# Patient Record
Sex: MALE | Race: BLACK OR AFRICAN AMERICAN | NOT HISPANIC OR LATINO | Employment: UNEMPLOYED | URBAN - METROPOLITAN AREA
[De-identification: names, ages, dates, MRNs, and addresses within clinical notes are randomized per-mention and may not be internally consistent; named-entity substitution may affect disease eponyms.]

---

## 2024-08-15 ENCOUNTER — HOSPITAL ENCOUNTER (EMERGENCY)
Facility: HOSPITAL | Age: 34
Discharge: HOME | End: 2024-08-15
Attending: FAMILY MEDICINE
Payer: COMMERCIAL

## 2024-08-15 VITALS
BODY MASS INDEX: 24.44 KG/M2 | SYSTOLIC BLOOD PRESSURE: 162 MMHG | OXYGEN SATURATION: 98 % | RESPIRATION RATE: 16 BRPM | WEIGHT: 165 LBS | DIASTOLIC BLOOD PRESSURE: 102 MMHG | HEART RATE: 98 BPM | HEIGHT: 69 IN | TEMPERATURE: 99 F

## 2024-08-15 DIAGNOSIS — R00.0 TACHYCARDIA: ICD-10-CM

## 2024-08-15 DIAGNOSIS — F14.90 COCAINE USE: Primary | ICD-10-CM

## 2024-08-15 DIAGNOSIS — Z00.8 MEDICAL CLEARANCE FOR INCARCERATION: ICD-10-CM

## 2024-08-15 DIAGNOSIS — R07.89 ATYPICAL CHEST PAIN: ICD-10-CM

## 2024-08-15 LAB
ABS NEUT CALC (OHS): 9.1 X10(3)/MCL (ref 2.1–9.2)
ALBUMIN SERPL-MCNC: 4.6 G/DL (ref 3.5–5)
ALBUMIN/GLOB SERPL: 1.2 RATIO (ref 1.1–2)
ALP SERPL-CCNC: 64 UNIT/L (ref 40–150)
ALT SERPL-CCNC: 17 UNIT/L (ref 0–55)
AMPHET UR QL SCN: POSITIVE
ANION GAP SERPL CALC-SCNC: 9 MEQ/L
ANISOCYTOSIS BLD QL SMEAR: ABNORMAL
APAP SERPL-MCNC: <3 UG/ML (ref 10–30)
AST SERPL-CCNC: 23 UNIT/L (ref 5–34)
BACTERIA #/AREA URNS AUTO: ABNORMAL /HPF
BARBITURATE SCN PRESENT UR: NEGATIVE
BENZODIAZ UR QL SCN: NEGATIVE
BILIRUB SERPL-MCNC: 0.6 MG/DL
BILIRUB UR QL STRIP.AUTO: NEGATIVE
BUN SERPL-MCNC: 13.5 MG/DL (ref 8.9–20.6)
CALCIUM SERPL-MCNC: 9.9 MG/DL (ref 8.4–10.2)
CANNABINOIDS UR QL SCN: POSITIVE
CHLORIDE SERPL-SCNC: 103 MMOL/L (ref 98–107)
CLARITY UR: CLEAR
CO2 SERPL-SCNC: 25 MMOL/L (ref 22–29)
COCAINE UR QL SCN: POSITIVE
COLOR UR AUTO: YELLOW
CREAT SERPL-MCNC: 1.02 MG/DL (ref 0.73–1.18)
CREAT/UREA NIT SERPL: 13
ERYTHROCYTE [DISTWIDTH] IN BLOOD BY AUTOMATED COUNT: 13.5 % (ref 11.5–17)
FENTANYL UR QL SCN: NEGATIVE
GFR SERPLBLD CREATININE-BSD FMLA CKD-EPI: >60 ML/MIN/1.73/M2
GLOBULIN SER-MCNC: 3.8 GM/DL (ref 2.4–3.5)
GLUCOSE SERPL-MCNC: 121 MG/DL (ref 74–100)
GLUCOSE UR QL STRIP: NORMAL
HCT VFR BLD AUTO: 44.2 % (ref 42–52)
HGB BLD-MCNC: 14.8 G/DL (ref 14–18)
HGB UR QL STRIP: ABNORMAL
HYALINE CASTS #/AREA URNS LPF: ABNORMAL /LPF
KETONES UR QL STRIP: ABNORMAL
LEUKOCYTE ESTERASE UR QL STRIP: NEGATIVE
LYMPHOCYTES NFR BLD MANUAL: 0.94 X10(3)/MCL
LYMPHOCYTES NFR BLD MANUAL: 9 % (ref 13–40)
MCH RBC QN AUTO: 31.1 PG (ref 27–31)
MCHC RBC AUTO-ENTMCNC: 33.5 G/DL (ref 33–36)
MCV RBC AUTO: 92.9 FL (ref 80–94)
MDMA UR QL SCN: NEGATIVE
MONOCYTES NFR BLD MANUAL: 0.42 X10(3)/MCL (ref 0.1–1.3)
MONOCYTES NFR BLD MANUAL: 4 % (ref 2–11)
MUCOUS THREADS URNS QL MICRO: ABNORMAL /LPF
NEUTROPHILS NFR BLD MANUAL: 87 % (ref 47–80)
NITRITE UR QL STRIP: NEGATIVE
NRBC BLD AUTO-RTO: 0 %
OHS QRS DURATION: 98 MS
OHS QTC CALCULATION: 467 MS
OPIATES UR QL SCN: NEGATIVE
PCP UR QL: NEGATIVE
PH UR STRIP: 5.5 [PH]
PH UR: 5.5 [PH] (ref 3–11)
PLATELET # BLD AUTO: 259 X10(3)/MCL (ref 130–400)
PLATELET # BLD EST: NORMAL 10*3/UL
PMV BLD AUTO: 10.6 FL (ref 7.4–10.4)
POTASSIUM SERPL-SCNC: 4.1 MMOL/L (ref 3.5–5.1)
PROT SERPL-MCNC: 8.4 GM/DL (ref 6.4–8.3)
PROT UR QL STRIP: ABNORMAL
RBC # BLD AUTO: 4.76 X10(6)/MCL (ref 4.7–6.1)
RBC #/AREA URNS AUTO: ABNORMAL /HPF
RBC MORPH BLD: ABNORMAL
SALICYLATES SERPL-MCNC: <5 MG/DL (ref 15–30)
SODIUM SERPL-SCNC: 137 MMOL/L (ref 136–145)
SP GR UR STRIP.AUTO: 1.03 (ref 1–1.03)
SPECIFIC GRAVITY, URINE AUTO (.000) (OHS): 1.03 (ref 1–1.03)
SQUAMOUS #/AREA URNS LPF: ABNORMAL /HPF
TROPONIN I SERPL-MCNC: <0.01 NG/ML (ref 0–0.04)
UROBILINOGEN UR STRIP-ACNC: ABNORMAL
WBC # BLD AUTO: 10.46 X10(3)/MCL (ref 4.5–11.5)
WBC #/AREA URNS AUTO: ABNORMAL /HPF

## 2024-08-15 PROCEDURE — 80307 DRUG TEST PRSMV CHEM ANLYZR: CPT | Performed by: FAMILY MEDICINE

## 2024-08-15 PROCEDURE — 84484 ASSAY OF TROPONIN QUANT: CPT | Performed by: FAMILY MEDICINE

## 2024-08-15 PROCEDURE — 80053 COMPREHEN METABOLIC PANEL: CPT | Performed by: FAMILY MEDICINE

## 2024-08-15 PROCEDURE — 80179 DRUG ASSAY SALICYLATE: CPT | Performed by: FAMILY MEDICINE

## 2024-08-15 PROCEDURE — 80143 DRUG ASSAY ACETAMINOPHEN: CPT | Performed by: FAMILY MEDICINE

## 2024-08-15 PROCEDURE — 96360 HYDRATION IV INFUSION INIT: CPT

## 2024-08-15 PROCEDURE — 81001 URINALYSIS AUTO W/SCOPE: CPT | Performed by: FAMILY MEDICINE

## 2024-08-15 PROCEDURE — 85027 COMPLETE CBC AUTOMATED: CPT | Performed by: FAMILY MEDICINE

## 2024-08-15 PROCEDURE — 25000003 PHARM REV CODE 250: Performed by: FAMILY MEDICINE

## 2024-08-15 PROCEDURE — 99284 EMERGENCY DEPT VISIT MOD MDM: CPT | Mod: 25

## 2024-08-15 PROCEDURE — 93005 ELECTROCARDIOGRAM TRACING: CPT

## 2024-08-15 PROCEDURE — 85007 BL SMEAR W/DIFF WBC COUNT: CPT | Performed by: FAMILY MEDICINE

## 2024-08-15 RX ORDER — AMLODIPINE BESYLATE 10 MG/1
5 TABLET ORAL DAILY
Qty: 15 TABLET | Refills: 0 | Status: SHIPPED | OUTPATIENT
Start: 2024-08-15 | End: 2024-09-14

## 2024-08-15 RX ADMIN — SODIUM CHLORIDE 1000 ML: 9 INJECTION, SOLUTION INTRAVENOUS at 04:08

## 2024-08-15 NOTE — ED PROVIDER NOTES
Encounter Date: 8/15/2024       History     Chief Complaint   Patient presents with    Ingestion     Pt. Swallowed bag of cocaine at residential PTA. Reports chest pain     Doc Lopez is a 35 yo male who presents to the ED today with complaints of palpitations and dry mouth. The patient reports that he ingested a packet of cocaine while outside the residential at 9 pm. He was taken back in and made to confess regarding his ingestion when he started having symptoms. He also mentions feeling sweats, 'very high,' nervousness and restlessness as well as having slow thinking, being nauseous and experiencing cramping in his abdomen and chest. He describes having had a brief 2-3 minutes episode of tremors while in his [facility] cell. Denies any CP, cough, SOB, fevers, chills, headaches, dizziness or any other complaints. This was his first time ingesting cocaine, although he does report consuming marijuana x2/day. Pt is unable to quantify the amount he consumed or clearly state his reason for doing so.    The history is provided by the patient. No  was used.     Review of patient's allergies indicates:   Allergen Reactions    Pcn [penicillins]      History reviewed. No pertinent past medical history.  History reviewed. No pertinent surgical history.  No family history on file.     Review of Systems   Constitutional:  Negative for chills, fatigue and fever.   HENT:  Negative for ear discharge, ear pain and sore throat.         Dry, numb mouth   Eyes:  Negative for pain and discharge.   Respiratory:  Positive for chest tightness. Negative for cough and shortness of breath.    Cardiovascular:  Positive for palpitations. Negative for chest pain and leg swelling.   Gastrointestinal:  Positive for nausea. Negative for constipation, diarrhea and vomiting.        Abdominal cramping +ve   Genitourinary:  Negative for dysuria, frequency and urgency.   Musculoskeletal:  Negative for arthralgias and myalgias.   Neurological:   Positive for tremors. Negative for dizziness, seizures and light-headedness.   Psychiatric/Behavioral:  The patient is nervous/anxious.         Slow thinking       Physical Exam     Initial Vitals [08/15/24 0408]   BP Pulse Resp Temp SpO2   (!) 180/116 (!) 119 20 98.6 °F (37 °C) 98 %      MAP       --         Physical Exam    Constitutional: He appears well-developed and well-nourished. He is not diaphoretic. No distress.   HENT:   Head: Normocephalic and atraumatic.   Eyes: Conjunctivae and EOM are normal.   Neck:   Normal range of motion.  Cardiovascular:  Regular rhythm.           Tachycardic   Pulmonary/Chest: Breath sounds normal. No respiratory distress. He has no wheezes.   Abdominal: Abdomen is soft. Bowel sounds are normal. He exhibits no distension. There is no abdominal tenderness.   Musculoskeletal:         General: Normal range of motion.      Cervical back: Normal range of motion.     Neurological: He is alert.   Skin: Skin is warm.   Psychiatric: He has a normal mood and affect.   Appears mildly restless, giving ambiguous answers         ED Course   Procedures  Labs Reviewed   COMPREHENSIVE METABOLIC PANEL - Abnormal       Result Value    Sodium 137      Potassium 4.1      Chloride 103      CO2 25      Glucose 121 (*)     Blood Urea Nitrogen 13.5      Creatinine 1.02      Calcium 9.9      Protein Total 8.4 (*)     Albumin 4.6      Globulin 3.8 (*)     Albumin/Globulin Ratio 1.2      Bilirubin Total 0.6      ALP 64      ALT 17      AST 23      eGFR >60      Anion Gap 9.0      BUN/Creatinine Ratio 13     URINALYSIS, REFLEX TO URINE CULTURE - Abnormal    Color, UA Yellow      Appearance, UA Clear      Specific Gravity, UA 1.027      pH, UA 5.5      Protein, UA Trace (*)     Glucose, UA Normal      Ketones, UA Trace (*)     Blood, UA 2+ (*)     Bilirubin, UA Negative      Urobilinogen, UA 1+ (*)     Nitrites, UA Negative      Leukocyte Esterase, UA Negative      RBC, UA 0-5      WBC, UA 0-5      Bacteria,  UA None Seen      Squamous Epithelial Cells, UA Trace (*)     Mucous, UA Trace (*)     Hyaline Casts, UA None Seen     DRUG SCREEN, URINE (BEAKER) - Abnormal    Amphetamines, Urine Positive (*)     Barbiturates, Urine Negative      Benzodiazepine, Urine Negative      Cannabinoids, Urine Positive (*)     Cocaine, Urine Positive (*)     Fentanyl, Urine Negative      MDMA, Urine Negative      Opiates, Urine Negative      Phencyclidine, Urine Negative      pH, Urine 5.5      Specific Gravity, Urine Auto 1.027      Narrative:     Cut off concentrations:    Amphetamines - 1000 ng/ml  Barbiturates - 200 ng/ml  Benzodiazepine - 200 ng/ml  Cannabinoids (THC) - 50 ng/ml  Cocaine - 300 ng/ml  Fentanyl - 1.0 ng/ml  MDMA - 500 ng/ml  Opiates - 300 ng/ml   Phencyclidine (PCP) - 25 ng/ml    Specimen submitted for drug analysis and tested for pH and specific gravity in order to evaluate sample integrity. Suspect tampering if specific gravity is <1.003 and/or pH is not within the range of 4.5 - 8.0  False negatives may result form substances such as bleach added to urine.  False positives may result for the presence of a substance with similar chemical structure to the drug or its metabolite.    This test provides only a PRELIMINARY analytical test result. A more specific alternate chemical method must be used in order to obtain a confirmed analytical result. Gas chromatography/mass spectrometry (GC/MS) is the preferred confirmatory method. Other chemical confirmation methods are available. Clinical consideration and professional judgement should be applied to any drug of abuse test result, particularly when preliminary positive results are used.    Positive results will be confirmed only at the physicians request. Unconfirmed screening results are to be used only for medical purposes (treatment).        ACETAMINOPHEN LEVEL - Abnormal    Acetaminophen Level <3.0 (*)    SALICYLATE LEVEL - Abnormal    Salicylate Level <5.0 (*)    CBC  WITH DIFFERENTIAL - Abnormal    WBC 10.46      RBC 4.76      Hgb 14.8      Hct 44.2      MCV 92.9      MCH 31.1 (*)     MCHC 33.5      RDW 13.5      Platelet 259      MPV 10.6 (*)     NRBC% 0.0     MANUAL DIFFERENTIAL - Abnormal    Neutrophils % 87 (*)     Lymphs % 9 (*)     Monocytes % 4      Neutrophils Abs Calc 9.1002      Lymphs Abs 0.9414      Monocytes Abs 0.4184      Platelets Normal      RBC Morph Abnormal (*)     Anisocytosis 1+ (*)    TROPONIN I - Normal    Troponin-I <0.010     CBC W/ AUTO DIFFERENTIAL    Narrative:     The following orders were created for panel order CBC Auto Differential.  Procedure                               Abnormality         Status                     ---------                               -----------         ------                     CBC with Differential[[id number]]       Abnormal            Final result               Manual Differential[[id number]]         Abnormal            Final result                 Please view results for these tests on the individual orders.        ECG Results              EKG 12-lead (Final result)        Collection Time Result Time QRS Duration OHS QTC Calculation    08/15/24 04:39:37 08/15/24 08:22:55 98 467                     Final result by Interface, Lab In [facility] (08/15/24 08:23:03)                   Narrative:    Test Reason : R00.0,    Vent. Rate : 107 BPM     Atrial Rate : 107 BPM     P-R Int : 184 ms          QRS Dur : 098 ms      QT Int : 350 ms       P-R-T Axes : 073 093 020 degrees     QTc Int : 467 ms    Sinus tachycardia  Rightward axis  Septal infarct ,age undetermined  Abnormal ECG  No previous ECGs available  Confirmed by Rey Malave MD ([id number]) on 8/15/2024 8:22:51 AM    Referred By: UNKNOWN REFERRING           Confirmed By:Rey Malave MD                                  Imaging Results    None          Medications   sodium chloride 0.9% bolus 1,000 mL 1,000 mL (0 mLs Intravenous Stopped 8/15/24 6970)     Medical Decision  Making  Amount and/or Complexity of Data Reviewed  Labs: ordered. Decision-making details documented in ED Course.    Risk  Prescription drug management.              Attending Attestation:   Physician Attestation Statement for Resident:  As the supervising MD   Physician Attestation Statement: I have personally seen and examined this patient.   I agree with the above history.  -:   As the supervising MD I agree with the above PE.     As the supervising MD I agree with the above treatment, course, plan, and disposition.    I have reviewed and agree with the residents interpretation of the following: lab data.                 ED Course as of 08/16/24 2138   Thu Aug 15, 2024   0519 WBC: 10.46 [MW]   0519 Hemoglobin: 14.8 [MW]   0519 Hematocrit: 44.2 [MW]   0519 Platelet Count: 259 [MW]   0519 Sodium: 137 [MW]   0519 Potassium: 4.1 [MW]   0519 Chloride: 103 [MW]   0519 CO2: 25 [MW]   0519 Glucose(!): 121 [MW]   0519 BUN: 13.5 [MW]   0519 Creatinine: 1.02 [MW]   0519 Albumin: 4.6 [MW]   0554 Blood, UA(!): 2+ [MW]   0554 RBC, UA: 0-5 [MW]   0554 WBC, UA: 0-5 [MW]      ED Course User Index  [MW] Marcell Mccray MD                           Clinical Impression:  Final diagnoses:  [R00.0] Tachycardia  [F14.90] Cocaine use (Primary)  [R07.89] Atypical chest pain  [Z00.8] Medical clearance for incarceration          ED Disposition Condition    Discharge Stable          ED Prescriptions       Medication Sig Dispense Start Date End Date Auth. Provider    amLODIPine (NORVASC) 10 MG tablet Take 0.5 tablets (5 mg total) by mouth once daily. 15 tablet 8/15/2024 9/14/2024 Marcell Mccray MD          Follow-up Information       Follow up With Specialties Details Why Contact Info    [facility] Emergency Dept Emergency Medicine  As needed, If symptoms worsen [address] [facility] [address] [phone]    long-term Physician                 Betzy Nathan MD  Resident  08/15/24 [id number]        Marcell Mccray MD  08/16/24 3432

## 2025-04-29 ENCOUNTER — HOSPITAL ENCOUNTER (EMERGENCY)
Facility: HOSPITAL | Age: 35
Discharge: HOME | End: 2025-04-29
Attending: EMERGENCY MEDICINE

## 2025-04-29 VITALS
WEIGHT: 223 LBS | RESPIRATION RATE: 17 BRPM | TEMPERATURE: 99 F | HEIGHT: 71 IN | SYSTOLIC BLOOD PRESSURE: 138 MMHG | BODY MASS INDEX: 31.22 KG/M2 | OXYGEN SATURATION: 97 % | DIASTOLIC BLOOD PRESSURE: 91 MMHG | HEART RATE: 71 BPM

## 2025-04-29 DIAGNOSIS — R10.9 RIGHT FLANK PAIN: Primary | ICD-10-CM

## 2025-04-29 LAB
BACTERIA #/AREA URNS AUTO: ABNORMAL /HPF
BILIRUB UR QL STRIP.AUTO: NEGATIVE
CLARITY UR: CLEAR
COLOR UR AUTO: YELLOW
GLUCOSE UR QL STRIP: NORMAL
HGB UR QL STRIP: ABNORMAL
HYALINE CASTS #/AREA URNS LPF: ABNORMAL /LPF
KETONES UR QL STRIP: NEGATIVE
LEUKOCYTE ESTERASE UR QL STRIP: NEGATIVE
MUCOUS THREADS URNS QL MICRO: ABNORMAL /LPF
NITRITE UR QL STRIP: NEGATIVE
PH UR STRIP: 7.5 [PH]
PROT UR QL STRIP: ABNORMAL
RBC #/AREA URNS AUTO: ABNORMAL /HPF
SP GR UR STRIP.AUTO: 1.03 (ref 1–1.03)
SQUAMOUS #/AREA URNS LPF: ABNORMAL /HPF
UROBILINOGEN UR STRIP-ACNC: ABNORMAL
WBC #/AREA URNS AUTO: ABNORMAL /HPF

## 2025-04-29 PROCEDURE — 99284 EMERGENCY DEPT VISIT MOD MDM: CPT | Mod: 25

## 2025-04-29 PROCEDURE — 81001 URINALYSIS AUTO W/SCOPE: CPT | Performed by: EMERGENCY MEDICINE

## 2025-04-29 RX ORDER — METHOCARBAMOL 500 MG/1
1000 TABLET, FILM COATED ORAL EVERY 8 HOURS PRN
Qty: 30 TABLET | Refills: 0 | Status: SHIPPED | OUTPATIENT
Start: 2025-04-29 | End: 2025-05-04

## 2025-04-29 NOTE — Clinical Note
"Doc Urias" John was seen and treated in our emergency department on 4/29/2025.  He may return to work on 05/01/2025.       If you have any questions or concerns, please don't hesitate to call.      EVELYN COBB    "

## 2025-04-30 NOTE — ED PROVIDER NOTES
Encounter Date: 4/29/2025       History     Chief Complaint   Patient presents with    Flank Pain    Abdominal Pain     States bilateral lower abdominal pain and bilateral flank pain.   was seen at [facility] and told to come to the ED for CT.  States occasional nause not presently.       Patient is a 34-year-old male presenting with complain of pain to the right flank and right lower back area.  Patient states that this pain has been intermittent for the past week or so.  He denies any focal weakness.  No hematuria.  No dysuria.  Patient denies any fever chills.      Review of patient's allergies indicates:   Allergen Reactions    Pcn [penicillins]      History reviewed. No pertinent past medical history.  Past Surgical History:   Procedure Laterality Date    arm and hand reconstruction Left      No family history on file.  Social History[1]  Review of Systems   Constitutional: Negative.    Respiratory: Negative.     Cardiovascular: Negative.    Gastrointestinal: Negative.    Genitourinary:  Positive for flank pain. Negative for dysuria, frequency and hematuria.   Musculoskeletal:  Positive for back pain and myalgias. Negative for neck pain and neck stiffness.   Neurological: Negative.    Hematological: Negative.        Physical Exam     Initial Vitals [04/29/25 2038]   BP Pulse Resp Temp SpO2   136/79 65 17 98.5 °F (36.9 °C) 95 %      MAP       --         Physical Exam    Nursing note and vitals reviewed.  Constitutional: He appears well-developed and well-nourished.   HENT:   Head: Normocephalic.   Neck: Neck supple.   Normal range of motion.  Cardiovascular:  Normal rate, regular rhythm and normal heart sounds.           Pulmonary/Chest: Breath sounds normal. No respiratory distress. He has no wheezes. He has no rhonchi. He has no rales.   Abdominal: Abdomen is soft. He exhibits no distension. There is no abdominal tenderness. There is no guarding.   Musculoskeletal:         General: Normal range  of motion.      Cervical back: Normal range of motion and neck supple.      Comments: Patient has mild tenderness to palpation to the left flank and left lower back area.     Neurological: He is alert and oriented to person, place, and time. He has normal strength.   Skin: Skin is warm.   Psychiatric: He has a normal mood and affect.         ED Course   Procedures  Labs Reviewed   URINALYSIS, REFLEX TO URINE CULTURE - Abnormal       Result Value    Color, UA Yellow      Appearance, UA Clear      Specific Gravity, UA 1.029      pH, UA 7.5      Protein, UA Trace (*)     Glucose, UA Normal      Ketones, UA Negative      Blood, UA 1+ (*)     Bilirubin, UA Negative      Urobilinogen, UA 3+ (*)     Nitrites, UA Negative      Leukocyte Esterase, UA Negative      RBC, UA 11-20 (*)     WBC, UA 0-5      Bacteria, UA None Seen      Squamous Epithelial Cells, UA Trace (*)     Mucous, UA Occasional (*)     Hyaline Casts, UA None Seen            Imaging Results              CT Abdomen Pelvis  Without Contrast (Preliminary result)  Result time 04/29/25 22:11:38      Preliminary result by Daniel Reed Jr., MD (04/29/25 22:11:38)                   Narrative:    START OF REPORT:  Technique: CT of the abdomen and pelvis was performed with axial images as well as sagittal and coronal reconstruction images without intravenous contrast.    Comparison: None available.    Clinical History: Right flank pain.    Dosage Information: Automated Exposure Control was utilized.    Findings:  Lines and Tubes: None.  Thorax:  Lungs: The visualized lung bases appear unremarkable.  Pleura: No effusions or thickening. No pneumothorax is seen.  Heart: The heart size is within normal limits.  Abdomen:  Abdominal Wall: No abdominal wall pathology is seen.  Liver: There are a few well defined fluid attenuating cystic lesions seen throughout the liver, largest measuring 7.2 mm seen in the posterio- superior segment of right lobe of liver on series  2 image 22 suggestive of simple cyst. The liver is otherwise unremarkable.  Biliary System: No intrahepatic or extrahepatic biliary duct dilatation is seen.  Gallbladder: The gallbladder is non-distended and appears otherwise unremarkable.  Pancreas: The pancreas appears unremarkable.  Spleen: The spleen appears unremarkable.  Adrenals: The adrenal glands appear unremarkable.  Kidneys: The kidneys appear unremarkable with no stones cysts masses or hydronephrosis.  Aorta: The visualized abdominal aorta appears unremarkable.  IVC: Unremarkable.  Bowel:  Esophagus: The visualized distal esophagus appears unremarkable.  Stomach: The stomach appears unremarkable.  Duodenum: Unremarkable appearing duodenum.  Small Bowel: The small bowel appears unremarkable.  Colon: There is moderate stool in the colon which could reflect an element of constipation.  Appendix: The appendix appears unremarkable and is seen on Image 90, Series 2 through Image 98, Series 2.  Peritoneum: No intraperitoneal free air or ascites is seen.    Pelvis:  Bladder: The bladder appears unremarkable.  Male:  Prostate gland: The prostate gland appears unremarkable.    Bony structures:  Dorsal Spine: The visualized dorsal spine appears unremarkable.  Bony Pelvis: The visualized bony structures of the pelvis appear unremarkable.      Impression:  1. No acute intraabdominal or pelvic solid organ or bowel pathology identified. Details and other findings as discussed above.                                         Medications - No data to display  Medical Decision Making  Differential diagnosis: Back strain, flank pain, UTI, kidney stone    Amount and/or Complexity of Data Reviewed  Labs: ordered.     Details: No UTI  Radiology: ordered and independent interpretation performed.  Discussion of management or test interpretation with external provider(s): CT shows no acute changes in the abdomen pelvis.  Will discharge home with a prescription for muscle  relaxer    Risk  Prescription drug management.                                      Clinical Impression:  Final diagnoses:  [R10.9] Right flank pain (Primary)          ED Disposition Condition    Discharge Stable          ED Prescriptions       Medication Sig Dispense Start Date End Date Auth. Provider    methocarbamoL (ROBAXIN) 500 MG Tab Take 2 tablets (1,000 mg total) by mouth every 8 (eight) hours as needed (Pain). 30 tablet 4/29/2025 5/4/2025 Erwin Cleaning MD          Follow-up Information    None            Erwin Cleaning MD  04/29/25 2224         [1]   Social History  Tobacco Use    Smoking status: Every Day     Types: Cigarettes    Smokeless tobacco: Never   Substance Use Topics    Alcohol use: Yes    Drug use: Not Currently        Erwin Cleaning MD  04/29/25 [id number]

## 2025-06-23 ENCOUNTER — HOSPITAL ENCOUNTER (EMERGENCY)
Facility: HOSPITAL | Age: 35
Discharge: HOME | End: 2025-06-23
Attending: INTERNAL MEDICINE

## 2025-06-23 VITALS
DIASTOLIC BLOOD PRESSURE: 70 MMHG | SYSTOLIC BLOOD PRESSURE: 117 MMHG | WEIGHT: 203 LBS | BODY MASS INDEX: 28.42 KG/M2 | HEART RATE: 46 BPM | TEMPERATURE: 98 F | HEIGHT: 71 IN | OXYGEN SATURATION: 100 % | RESPIRATION RATE: 20 BRPM

## 2025-06-23 DIAGNOSIS — R51.9 NONINTRACTABLE HEADACHE, UNSPECIFIED CHRONICITY PATTERN, UNSPECIFIED HEADACHE TYPE: Primary | ICD-10-CM

## 2025-06-23 LAB
FLUAV AG UPPER RESP QL IA.RAPID: NOT DETECTED
FLUBV AG UPPER RESP QL IA.RAPID: NOT DETECTED
SARS-COV-2 RNA RESP QL NAA+PROBE: NOT DETECTED
STREP A PCR (OHS): NOT DETECTED

## 2025-06-23 PROCEDURE — 0240U COVID/FLU A&B PCR: CPT

## 2025-06-23 PROCEDURE — 87651 STREP A DNA AMP PROBE: CPT

## 2025-06-23 PROCEDURE — 96374 THER/PROPH/DIAG INJ IV PUSH: CPT

## 2025-06-23 PROCEDURE — 25000003 PHARM REV CODE 250

## 2025-06-23 PROCEDURE — 63600175 PHARM REV CODE 636 W HCPCS: Mod: JZ,TB

## 2025-06-23 PROCEDURE — 96375 TX/PRO/DX INJ NEW DRUG ADDON: CPT

## 2025-06-23 PROCEDURE — 99285 EMERGENCY DEPT VISIT HI MDM: CPT | Mod: 25

## 2025-06-23 RX ORDER — BUTALBITAL, ACETAMINOPHEN AND CAFFEINE 50; 325; 40 MG/1; MG/1; MG/1
1 TABLET ORAL EVERY 4 HOURS PRN
Qty: 16 TABLET | Refills: 0 | Status: SHIPPED | OUTPATIENT
Start: 2025-06-23 | End: 2025-06-27

## 2025-06-23 RX ORDER — DIPHENHYDRAMINE HYDROCHLORIDE 50 MG/ML
25 INJECTION, SOLUTION INTRAMUSCULAR; INTRAVENOUS
Status: COMPLETED | OUTPATIENT
Start: 2025-06-23 | End: 2025-06-23

## 2025-06-23 RX ORDER — KETOROLAC TROMETHAMINE 30 MG/ML
15 INJECTION, SOLUTION INTRAMUSCULAR; INTRAVENOUS
Status: COMPLETED | OUTPATIENT
Start: 2025-06-23 | End: 2025-06-23

## 2025-06-23 RX ORDER — METOCLOPRAMIDE HYDROCHLORIDE 5 MG/ML
10 INJECTION INTRAMUSCULAR; INTRAVENOUS
Status: COMPLETED | OUTPATIENT
Start: 2025-06-23 | End: 2025-06-23

## 2025-06-23 RX ADMIN — METOCLOPRAMIDE 10 MG: 5 INJECTION, SOLUTION INTRAMUSCULAR; INTRAVENOUS at 01:06

## 2025-06-23 RX ADMIN — KETOROLAC TROMETHAMINE 15 MG: 30 INJECTION, SOLUTION INTRAMUSCULAR; INTRAVENOUS at 01:06

## 2025-06-23 RX ADMIN — SODIUM CHLORIDE 1000 ML: 9 INJECTION, SOLUTION INTRAVENOUS at 01:06

## 2025-06-23 RX ADMIN — DIPHENHYDRAMINE HYDROCHLORIDE 25 MG: 50 INJECTION INTRAMUSCULAR; INTRAVENOUS at 01:06

## 2025-06-23 NOTE — FIRST PROVIDER EVALUATION
"Medical screening examination initiated.  I have conducted a focused provider triage encounter, findings are as follows:    Brief history of present illness:  35 year old male presents to ER with severe headache and nausea x 4 days. Reports sore throat and body aches. Hx migraines    Vitals:    06/23/25 1132   BP: 123/77   BP Location: Left arm   Pulse: (!) 47   Resp: 20   Temp: 98.2 °F (36.8 °C)   TempSrc: Oral   SpO2: 99%   Weight: 92.1 kg (203 lb)   Height: 5' 11" (1.803 m)       Pertinent physical exam:  awake and alert, nad    Brief workup plan:  swabs, imaging, medications     Preliminary workup initiated; this workup will be continued and followed by the physician or advanced practice provider that is assigned to the patient when roomed.  "

## 2025-06-23 NOTE — ED PROVIDER NOTES
Encounter Date: 6/23/2025       History     Chief Complaint   Patient presents with    Headache     Pt co migraine, dizziness, rash, nausea, neck pain for past 4 days. Pt taking motrin.     The patient is a 35 y.o. male who presents to the Emergency Department with a chief complaint of headache. Symptoms began 4 days ago and have been constant since onset. His pain is currently rated as a 9/10 in severity and described as aching with no radiation. Associated symptoms include sore throat and body aches. Symptoms are aggravated with nothing and there are no alleviating factors. The patient denies fever or chills. He denies changes in vision. He reports taking nothing prior to arrival with no relief of symptoms. No other reported symptoms at this time     The history is provided by the patient. No  was used.   Headache   This is a new problem. The current episode started in the past 7 days. The problem occurs daily. The problem has been unchanged. The pain is located in the Bilateral region. The pain does not radiate. The pain quality is similar to prior headaches. The quality of the pain is described as aching. The pain is at a severity of 9/10. Associated symptoms include a sore throat. Pertinent negatives include no back pain, blurred vision, dizziness, fever, loss of balance, nausea, seizures, visual change or weakness. Nothing aggravates the symptoms. The treatment provided no relief.     Review of patient's allergies indicates:   Allergen Reactions    Pcn [penicillins]      History reviewed. No pertinent past medical history.  Past Surgical History:   Procedure Laterality Date    arm and hand reconstruction Left      No family history on file.  Social History[1]  Review of Systems   Constitutional:  Negative for chills and fever.   HENT:  Positive for sore throat. Negative for congestion.    Eyes:  Negative for blurred vision.   Respiratory:  Negative for shortness of breath.     Cardiovascular:  Negative for chest pain.   Gastrointestinal:  Negative for nausea.   Genitourinary:  Negative for dysuria.   Musculoskeletal:  Positive for arthralgias. Negative for back pain.   Skin:  Negative for rash.   Neurological:  Positive for headaches. Negative for dizziness, seizures, weakness and loss of balance.   Hematological:  Does not bruise/bleed easily.   All other systems reviewed and are negative.      Physical Exam     Initial Vitals [06/23/25 1132]   BP Pulse Resp Temp SpO2   123/77 (!) 47 20 98.2 °F (36.8 °C) 99 %      MAP       --         Physical Exam    Nursing note and vitals reviewed.  Constitutional: Vital signs are normal. He appears well-developed and well-nourished.   HENT:   Head: Normocephalic.   Right Ear: Hearing and tympanic membrane normal.   Left Ear: Hearing and tympanic membrane normal. Mouth/Throat: Uvula is midline, oropharynx is clear and moist and mucous membranes are normal.   Eyes: Conjunctivae and EOM are normal. Pupils are equal, round, and reactive to light.   Cardiovascular:  Normal rate, regular rhythm, normal heart sounds and normal pulses.           Pulmonary/Chest: Effort normal and breath sounds normal.   Abdominal: Abdomen is soft. Bowel sounds are normal. There is no abdominal tenderness.   Musculoskeletal:      Cervical back: No spinous process tenderness or muscular tenderness.     Lymphadenopathy:     He has no cervical adenopathy.   Neurological: He is alert. GCS eye subscore is 4. GCS verbal subscore is 5. GCS motor subscore is 6.   Skin: Skin is warm, dry and intact. Capillary refill takes less than 2 seconds.         ED Course   Procedures  Labs Reviewed   COVID/FLU A&B PCR - Normal       Result Value    Influenza A PCR Not Detected      Influenza B PCR Not Detected      SARS-CoV-2 PCR Not Detected      Narrative:     The Xpert Xpress SARS-CoV-2/FLU/RSV plus is a rapid, multiplexed real-time PCR test intended for the simultaneous qualitative  detection and differentiation of SARS-CoV-2, Influenza A, Influenza B, and respiratory syncytial virus (RSV) viral RNA in either nasopharyngeal swab or nasal swab specimens.         STREP GROUP A BY PCR - Normal    STREP A PCR ([facility]) Not Detected      Narrative:     The Xpert Xpress Strep A test is a rapid, qualitative in vitro diagnostic test for the detection of Streptococcus pyogenes (Group A ß-hemolytic Streptococcus, Strep A) in throat swab specimens from patients with signs and symptoms of pharyngitis.            Imaging Results              CT Head Without Contrast (Final result)  Result time 06/23/25 12:01:10      Final result by Ramses Chavira MD (06/23/25 12:01:10)                   Impression:      No acute intracranial abnormality.      Electronically signed by: Ramses Chavira  Date:    06/23/2025  Time:    12:01               Narrative:    EXAMINATION:  CT HEAD WITHOUT CONTRAST    CLINICAL HISTORY:  Headache, sudden, severe;    TECHNIQUE:  Low dose axial images were obtained through the head.  Coronal and sagittal reformations were also performed. Contrast was not administered.  Dose reduction techniques including automatic exposure control (AEC) were utilized.    Dose (DLP): 1097 mGycm    COMPARISON:  CT head without contrast 03/13/2018.    FINDINGS:  INTRACRANIAL: Brain parenchyma demonstrates normal attenuation and configuration.  No acute intracranial hemorrhage.  No hydrocephalus.  No intracranial mass effect.    SINUSES: Visualized paranasal sinuses and mastoids are clear.    SKULL/SCALP: Visualized osseous structures are normal.    ORBITS: Visualized orbits are normal.                                       Medications   sodium chloride 0.9% bolus 1,000 mL 1,000 mL (1,000 mLs Intravenous New Bag 6/23/25 1321)   metoclopramide injection 10 mg (10 mg Intravenous Given 6/23/25 1321)   diphenhydrAMINE injection 25 mg (25 mg Intravenous Given 6/23/25 1321)   ketorolac injection 15 mg (15 mg Intravenous  Given 6/23/25 1319)     Medical Decision Making  The patient is a 35 y.o. male who presents to the Emergency Department with a chief complaint of headache. Symptoms began 4 days ago and have been constant since onset. His pain is currently rated as a 9/10 in severity and described as aching with no radiation. Associated symptoms include sore throat and body aches. Symptoms are aggravated with nothing and there are no alleviating factors. The patient denies fever or chills. He denies changes in vision. He reports taking nothing prior to arrival with no relief of symptoms. No other reported symptoms at this time     Judging by the patient's chief complaint and pertinent history, the patient has the following possible differential diagnoses, including but not limited to the following.  Some of these are deemed to be lower likelihood and some more likely based on my physical exam and history combined with possible lab work and/or imaging studies.   Please see the pertinent studies, and refer to the HPI.  Some of these diagnoses will take further evaluation to fully rule out, perhaps as an outpatient and the patient was encouraged to follow up when discharged for more comprehensive evaluation.    SAH, intracranial hemorrhage, mass, migraine, tension headache, cluster headache, viral infection, bacterial infection, meningitis/encephalitis, increase icp, acute angle closure glaucoma, temporal arteritis, cervical artery dissection, venous sinus thrombosis, HTN, ent/dental infection, pseudotumor cerebri, preeclampsia     Amount and/or Complexity of Data Reviewed  Radiology: ordered.    Risk  Prescription drug management.               ED Course as of 06/23/25 1413   Mon Jun 23, 2025   1405 Patient reports resolution of symptoms. I discussed results in detail with patient including follow up. He is amendable to plan and ready for dc home. Given strict ER return precautions.  [LM]      ED Course User Index  [LM] Yvette  Sam ALY NP                           Clinical Impression:  Final diagnoses:  [R51.9] Nonintractable headache, unspecified chronicity pattern, unspecified headache type (Primary)          ED Disposition Condition    Discharge Stable          ED Prescriptions       Medication Sig Dispense Start Date End Date Auth. Provider    butalbital-acetaminophen-caffeine -40 mg (FIORICET, ESGIC) -40 mg per tablet Take 1 tablet by mouth every 4 (four) hours as needed for Headaches. 16 tablet 6/23/2025 6/27/2025 Sam Crawford NP          Follow-up Information       Follow up With Specialties Details Why Contact Info    Please contact [phone] to establish care with a primary care provider  Schedule an appointment as soon as possible for a visit                      [1]   Social History  Tobacco Use    Smoking status: Every Day     Types: Cigarettes    Smokeless tobacco: Never   Substance Use Topics    Alcohol use: Yes    Drug use: Not Currently        Sam Crawford NP  06/23/25 1531